# Patient Record
Sex: FEMALE | Race: WHITE | NOT HISPANIC OR LATINO | Employment: FULL TIME | URBAN - METROPOLITAN AREA
[De-identification: names, ages, dates, MRNs, and addresses within clinical notes are randomized per-mention and may not be internally consistent; named-entity substitution may affect disease eponyms.]

---
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
## 2022-10-11 ENCOUNTER — HOSPITAL ENCOUNTER (EMERGENCY)
Facility: HOSPITAL | Age: 22
Discharge: HOME | End: 2022-10-11
Payer: MEDICAID

## 2022-10-11 VITALS
HEART RATE: 84 BPM | TEMPERATURE: 100 F | OXYGEN SATURATION: 98 % | DIASTOLIC BLOOD PRESSURE: 97 MMHG | WEIGHT: 150 LBS | HEIGHT: 66 IN | SYSTOLIC BLOOD PRESSURE: 129 MMHG | BODY MASS INDEX: 24.11 KG/M2 | RESPIRATION RATE: 18 BRPM

## 2022-10-11 DIAGNOSIS — R05.1 ACUTE COUGH: Primary | ICD-10-CM

## 2022-10-11 DIAGNOSIS — J06.9 UPPER RESPIRATORY TRACT INFECTION, UNSPECIFIED TYPE: ICD-10-CM

## 2022-10-11 LAB
GROUP A STREP, MOLECULAR: NEGATIVE
SARS-COV-2 RDRP RESP QL NAA+PROBE: NEGATIVE

## 2022-10-11 PROCEDURE — 87651 STREP A DNA AMP PROBE: CPT | Performed by: NURSE PRACTITIONER

## 2022-10-11 PROCEDURE — U0002 COVID-19 LAB TEST NON-CDC: HCPCS | Performed by: NURSE PRACTITIONER

## 2022-10-11 PROCEDURE — 99284 EMERGENCY DEPT VISIT MOD MDM: CPT

## 2022-10-11 RX ORDER — ALBUTEROL SULFATE 90 UG/1
2 AEROSOL, METERED RESPIRATORY (INHALATION) EVERY 6 HOURS PRN
Qty: 90 G | Refills: 0 | Status: SHIPPED | OUTPATIENT
Start: 2022-10-11 | End: 2023-10-11

## 2022-10-11 RX ORDER — AZITHROMYCIN 250 MG/1
TABLET, FILM COATED ORAL
Qty: 6 TABLET | Refills: 0 | Status: SHIPPED | OUTPATIENT
Start: 2022-10-11 | End: 2022-10-16

## 2022-10-11 NOTE — Clinical Note
"Stephanie MATTHEWS"Kirk Nathan was seen and treated in our emergency department on 10/11/2022.  She may return to school on 10/12/2022.      If you have any questions or concerns, please don't hesitate to call.      Adarsh Whitmore NP"

## 2022-10-12 NOTE — ED PROVIDER NOTES
Encounter Date: 10/11/2022       History     Chief Complaint   Patient presents with    Cough     Pt c/o cough since Saturday.      Patient is a 21 year female presents emergency room with mother with persistent cough and complaint of chest soreness now secondary to coughing.  Patient denies having any fever, chills, nausea, vomiting diarrhea.  Patient denies any obvious sinus drainage or sore throat.  Patient states she started have some chest discomfort when she is coughing.  Patient states cough is worse at night when she lays down.    Review of patient's allergies indicates:  No Known Allergies  Past Medical History:   Diagnosis Date    Migraine headache      Past Surgical History:   Procedure Laterality Date    TYMPANOSTOMY TUBE PLACEMENT       No family history on file.  Social History     Tobacco Use    Smoking status: Never    Smokeless tobacco: Never   Substance Use Topics    Alcohol use: No    Drug use: No     Review of Systems   Constitutional: Negative.    HENT:  Negative for postnasal drip, rhinorrhea, sore throat and trouble swallowing.    Eyes: Negative.    Respiratory:  Positive for cough and chest tightness (With coughing). Negative for shortness of breath and wheezing.    Cardiovascular: Negative.    Gastrointestinal: Negative.    Endocrine: Negative.    Genitourinary: Negative.    Musculoskeletal: Negative.    Allergic/Immunologic: Negative for food allergies.   Neurological: Negative.    Hematological: Negative.    Psychiatric/Behavioral: Negative.     All other systems reviewed and are negative.    Physical Exam     Initial Vitals [10/11/22 2030]   BP Pulse Resp Temp SpO2   (!) 129/97 84 18 99.5 °F (37.5 °C) 98 %      MAP       --         Physical Exam    Nursing note and vitals reviewed.  Constitutional: She appears well-developed and well-nourished. She is not diaphoretic. No distress.   HENT:   Head: Normocephalic and atraumatic.   Mouth/Throat: Mucous membranes are normal. Posterior  oropharyngeal erythema present. No oropharyngeal exudate.   Eyes: EOM are normal. Pupils are equal, round, and reactive to light. No scleral icterus.   Neck:   Normal range of motion.  Cardiovascular:  Normal rate, regular rhythm, normal heart sounds and intact distal pulses.           No murmur heard.  Pulmonary/Chest: Breath sounds normal. No respiratory distress. She has no wheezes. She has no rhonchi. She has no rales. She exhibits no tenderness.   Musculoskeletal:         General: No edema. Normal range of motion.      Cervical back: Normal range of motion.     Lymphadenopathy:     She has no cervical adenopathy.   Neurological: She is alert. She has normal strength. GCS score is 15. GCS eye subscore is 4. GCS verbal subscore is 5. GCS motor subscore is 6.   Skin: Skin is warm and dry. Capillary refill takes 2 to 3 seconds.   Psychiatric: She has a normal mood and affect.       ED Course   Procedures  Labs Reviewed   GROUP A STREP, MOLECULAR   SARS-COV-2 RNA AMPLIFICATION, QUAL    Narrative:     Is the patient symptomatic?->Yes          Imaging Results    None          Medications - No data to display  Medical Decision Making:   Initial Assessment:   Patient seen examined emergency room.  Assessment as noted above.  Patient appears acute distress this time.  No significant coughing during exam.  Differential Diagnosis:   Bronchitis, pneumonia, asthma, COVID, strep, flu  Clinical Tests:   Lab Tests: Ordered and Reviewed       <> Summary of Lab: Strep and COVID are negative.  ED Management:  After patient was examined, labs reviewed.  Will treat patient for sinusitis, allergic rhinitis, cough.  Will prescribe patient azithromycin for sinusitis, albuterol as needed to aid with possible bronchospasms from a cough.  Advised patient mother she can take over-the-counter Zyrtec daily for allergic rhinitis as well as over-the-counter cough medicine as needed for cough.                        Clinical Impression:   Final  diagnoses:  [R05.1] Acute cough (Primary)  [J06.9] Upper respiratory tract infection, unspecified type      ED Disposition Condition    Discharge Stable          ED Prescriptions       Medication Sig Dispense Start Date End Date Auth. Provider    azithromycin (Z-SHYAM) 250 MG tablet Take 2 tablets by mouth on day 1; Take 1 tablet by mouth on days 2-5 6 tablet 10/11/2022 10/16/2022 Adarsh Whitmore NP    albuterol (PROVENTIL/VENTOLIN HFA) 90 mcg/actuation inhaler Inhale 2 puffs into the lungs every 6 (six) hours as needed for Wheezing or Shortness of Breath (Or cough). Rescue 90 g 10/11/2022 10/11/2023 Adarsh Whitmore NP          Follow-up Information       Follow up With Specialties Details Why Contact Info    [facility] In 1 week If symptoms worsen, As needed [address] [facility] [address] [phone]               Adarsh Whitmore NP  10/11/22 8072

## 2022-10-12 NOTE — DISCHARGE INSTRUCTIONS
Take medication as prescribed.    I would advised to take Zyrtec every day for seasonal allergies.    Use albuterol as needed for shortness of breath, wheezing or uncontrolled cough.    You may also use over-the-counter cough medication as needed for cough if inhaler does not help.  You may follow-up with primary care provider return emergency room if you have any worsening symptoms, or do not show any improvement in next 3-5 days.

## 2022-12-04 ENCOUNTER — OFFICE VISIT (OUTPATIENT)
Dept: URGENT CARE | Facility: CLINIC | Age: 22
End: 2022-12-04
Payer: MEDICAID

## 2022-12-04 VITALS
SYSTOLIC BLOOD PRESSURE: 110 MMHG | DIASTOLIC BLOOD PRESSURE: 72 MMHG | TEMPERATURE: 100 F | HEIGHT: 66 IN | BODY MASS INDEX: 24.11 KG/M2 | HEART RATE: 84 BPM | WEIGHT: 150 LBS | OXYGEN SATURATION: 98 %

## 2022-12-04 DIAGNOSIS — J11.1 INFLUENZA: Primary | ICD-10-CM

## 2022-12-04 DIAGNOSIS — R50.9 FEVER, UNSPECIFIED FEVER CAUSE: ICD-10-CM

## 2022-12-04 LAB
CTP QC/QA: YES
MOLECULAR STREP A: NEGATIVE
POC MOLECULAR INFLUENZA A AGN: POSITIVE
POC MOLECULAR INFLUENZA B AGN: NEGATIVE
SARS-COV-2 AG RESP QL IA.RAPID: NEGATIVE

## 2022-12-04 PROCEDURE — 1160F RVW MEDS BY RX/DR IN RCRD: CPT | Mod: CPTII,S$GLB,, | Performed by: NURSE PRACTITIONER

## 2022-12-04 PROCEDURE — 99203 PR OFFICE/OUTPT VISIT, NEW, LEVL III, 30-44 MIN: ICD-10-PCS | Mod: S$GLB,,, | Performed by: NURSE PRACTITIONER

## 2022-12-04 PROCEDURE — 3078F PR MOST RECENT DIASTOLIC BLOOD PRESSURE < 80 MM HG: ICD-10-PCS | Mod: CPTII,S$GLB,, | Performed by: NURSE PRACTITIONER

## 2022-12-04 PROCEDURE — 3078F DIAST BP <80 MM HG: CPT | Mod: CPTII,S$GLB,, | Performed by: NURSE PRACTITIONER

## 2022-12-04 PROCEDURE — 87811 SARS CORONAVIRUS 2 ANTIGEN POCT, MANUAL READ: ICD-10-PCS | Mod: QW,S$GLB,, | Performed by: NURSE PRACTITIONER

## 2022-12-04 PROCEDURE — 3074F SYST BP LT 130 MM HG: CPT | Mod: CPTII,S$GLB,, | Performed by: NURSE PRACTITIONER

## 2022-12-04 PROCEDURE — 87502 INFLUENZA DNA AMP PROBE: CPT | Mod: QW,S$GLB,, | Performed by: NURSE PRACTITIONER

## 2022-12-04 PROCEDURE — 87651 POCT STREP A MOLECULAR: ICD-10-PCS | Mod: QW,S$GLB,, | Performed by: NURSE PRACTITIONER

## 2022-12-04 PROCEDURE — 1160F PR REVIEW ALL MEDS BY PRESCRIBER/CLIN PHARMACIST DOCUMENTED: ICD-10-PCS | Mod: CPTII,S$GLB,, | Performed by: NURSE PRACTITIONER

## 2022-12-04 PROCEDURE — 1159F MED LIST DOCD IN RCRD: CPT | Mod: CPTII,S$GLB,, | Performed by: NURSE PRACTITIONER

## 2022-12-04 PROCEDURE — 1159F PR MEDICATION LIST DOCUMENTED IN MEDICAL RECORD: ICD-10-PCS | Mod: CPTII,S$GLB,, | Performed by: NURSE PRACTITIONER

## 2022-12-04 PROCEDURE — 87651 STREP A DNA AMP PROBE: CPT | Mod: QW,S$GLB,, | Performed by: NURSE PRACTITIONER

## 2022-12-04 PROCEDURE — 3008F PR BODY MASS INDEX (BMI) DOCUMENTED: ICD-10-PCS | Mod: CPTII,S$GLB,, | Performed by: NURSE PRACTITIONER

## 2022-12-04 PROCEDURE — 99203 OFFICE O/P NEW LOW 30 MIN: CPT | Mod: S$GLB,,, | Performed by: NURSE PRACTITIONER

## 2022-12-04 PROCEDURE — 3008F BODY MASS INDEX DOCD: CPT | Mod: CPTII,S$GLB,, | Performed by: NURSE PRACTITIONER

## 2022-12-04 PROCEDURE — 87811 SARS-COV-2 COVID19 W/OPTIC: CPT | Mod: QW,S$GLB,, | Performed by: NURSE PRACTITIONER

## 2022-12-04 PROCEDURE — 3074F PR MOST RECENT SYSTOLIC BLOOD PRESSURE < 130 MM HG: ICD-10-PCS | Mod: CPTII,S$GLB,, | Performed by: NURSE PRACTITIONER

## 2022-12-04 PROCEDURE — 87502 POCT INFLUENZA A/B MOLECULAR: ICD-10-PCS | Mod: QW,S$GLB,, | Performed by: NURSE PRACTITIONER

## 2022-12-04 RX ORDER — OSELTAMIVIR PHOSPHATE 75 MG/1
75 CAPSULE ORAL EVERY 12 HOURS
Qty: 10 CAPSULE | Refills: 0 | Status: SHIPPED | OUTPATIENT
Start: 2022-12-04 | End: 2022-12-09

## 2022-12-04 NOTE — LETTER
December 4, 2022      [place] [facility]  [facility]  [address], [address]  [facility] [address]  Phone: [phone]  Fax: [phone]       Patient: Stephanie Nathan   YOB: 2000  Date of Visit: 12/04/2022    To Whom It May Concern:    PLEASE EXCUSE FROM WORK: 12/04/22 - 12/07/22. If you have any questions or concerns, or if I can be of further assistance, please do not hesitate to contact me.    Sincerely,    Charles Fink NP

## 2022-12-04 NOTE — PROGRESS NOTES
"Subjective:       Patient ID: Stephanie Nathan is a 22 y.o. female.    Vitals:  height is 5' 6" (1.676 m) and weight is 68 kg (150 lb). Her oral temperature is 100.3 °F (37.9 °C). Her blood pressure is 110/72 and her pulse is 84. Her oxygen saturation is 98%.     Chief Complaint: Fever and Cough    This is a 22 y.o. female who presents today with a chief complaint of fever and cough x 24hr.    Fever   This is a new problem. The current episode started yesterday. The problem has been gradually worsening. The maximum temperature noted was 100 to 100.9 F. The temperature was taken using an oral thermometer. Associated symptoms include abdominal pain, chest pain, coughing, diarrhea, headaches, muscle aches, nausea and a sore throat. She has tried nothing for the symptoms.   Cough  Associated symptoms include chest pain, a fever, headaches and a sore throat.     Constitution: Positive for fever.   HENT:  Positive for sore throat.    Cardiovascular:  Positive for chest pain.   Respiratory:  Positive for cough.    Gastrointestinal:  Positive for abdominal pain, nausea and diarrhea.   Neurological:  Positive for headaches.     Objective:      Physical Exam   Constitutional: She is cooperative. normalawake  HENT:   Head: Normocephalic and atraumatic.   Ears:   Right Ear: Hearing, tympanic membrane, external ear and ear canal normal.   Left Ear: Hearing, tympanic membrane, external ear and ear canal normal.   Nose: Nose normal.   Mouth/Throat: Uvula is midline, oropharynx is clear and moist and mucous membranes are normal. Mucous membranes are moist. Oropharynx is clear.   Eyes: Conjunctivae and lids are normal. Pupils are equal, round, and reactive to light. Extraocular movement intact   Neck: Trachea normal and phonation normal. Neck supple.   Cardiovascular: Normal rate, regular rhythm, normal heart sounds and normal pulses.   Pulmonary/Chest: Effort normal and breath sounds normal.   Abdominal: Normal appearance. "   Musculoskeletal: Normal range of motion.         General: Normal range of motion.   Lymphadenopathy:     She has no cervical adenopathy.   Neurological: She is alert.   Skin: Skin is warm, dry and intact.   Psychiatric: Her behavior is normal. Mood normal.   Vitals reviewed.      Results for orders placed or performed in visit on 12/04/22   POCT Influenza A/B MOLECULAR   Result Value Ref Range    POC Molecular Influenza A Ag Positive (A) Negative, Not Reported    POC Molecular Influenza B Ag Negative Negative, Not Reported     Acceptable Yes    POCT Strep A, Molecular   Result Value Ref Range    Molecular Strep A, POC Negative Negative     Acceptable Yes    SARS Coronavirus 2 Antigen, POCT Manual Read   Result Value Ref Range    SARS Coronavirus 2 Antigen Negative Negative     Acceptable Yes     No results found.     Assessment:       1. Influenza    2. Fever, unspecified fever cause          Plan:         Influenza    Fever, unspecified fever cause  -     POCT Influenza A/B MOLECULAR  -     POCT Strep A, Molecular  -     SARS Coronavirus 2 Antigen, POCT Manual Read

## 2024-12-10 ENCOUNTER — OFFICE VISIT (OUTPATIENT)
Dept: PODIATRY | Facility: CLINIC | Age: 24
End: 2024-12-10

## 2024-12-10 VITALS
SYSTOLIC BLOOD PRESSURE: 126 MMHG | HEART RATE: 76 BPM | HEIGHT: 66 IN | WEIGHT: 162.81 LBS | DIASTOLIC BLOOD PRESSURE: 77 MMHG | BODY MASS INDEX: 26.17 KG/M2

## 2024-12-10 DIAGNOSIS — B35.1 ONYCHOMYCOSIS DUE TO DERMATOPHYTE: Primary | ICD-10-CM

## 2024-12-10 PROCEDURE — 99999 PR PBB SHADOW E&M-EST. PATIENT-LVL III: CPT | Mod: PBBFAC,,, | Performed by: PODIATRIST

## 2024-12-10 PROCEDURE — 99203 OFFICE O/P NEW LOW 30 MIN: CPT | Mod: ,,, | Performed by: PODIATRIST

## 2024-12-14 PROBLEM — B35.1 ONYCHOMYCOSIS DUE TO DERMATOPHYTE: Status: ACTIVE | Noted: 2024-12-14

## 2024-12-14 NOTE — PROGRESS NOTES
Subjective:       Patient ID: Stephanie Nathan is a 24 y.o. female.    Chief Complaint: Fungus (Right great toe)    Patient presents today she is complaining about fungal toenails primarily on her right great toe she states about a year ago she lost both of her big toenails the left when grew an okay the right 1 grew in very thick with what she thinks to be fungus.  Past Medical History:   Diagnosis Date    Migraine headache      Past Surgical History:   Procedure Laterality Date    TYMPANOSTOMY TUBE PLACEMENT       No family history on file.  Social History     Socioeconomic History    Marital status: Single   Tobacco Use    Smoking status: Never    Smokeless tobacco: Never   Substance and Sexual Activity    Alcohol use: No    Drug use: No    Sexual activity: Yes     Partners: Male     Birth control/protection: OCP   Social History Narrative    Plans from [facility]        Adolescent exam    Acne    Dysmenorrhea    Pelvic pain    Menorrhagia    Irregular menstrual cycles        visit Summary:    CBC, TSH    GC/CT    Denies sexual activity or tampon use- pelvic exam deferred        Ordered:    PELVIS COMPLETE- within normal limits, resolving right ovarian cyst        Prescriptions:    SIG: Apri 0.15-0.03 mg oral tablet, 28 days, Dispense #28 Tablet, 11 Refills    Directions: Take 1 oral tablet once a day        Plan:    Return for follow up appointment in 3 months       Current Outpatient Medications   Medication Sig Dispense Refill    desogestreL-ethinyl estradioL (ISIBLOOM) 0.15-0.03 mg per tablet Take 1 tablet by mouth once daily. 84 tablet 3     No current facility-administered medications for this visit.     Review of patient's allergies indicates:  No Known Allergies    Review of Systems   Skin:  Positive for color change.   All other systems reviewed and are negative.      Objective:      Vitals:    12/10/24 1526   BP: 126/77   BP Location: Left arm   Patient Position: Sitting   Pulse: 76   Weight: 73.8 kg (162  "lb 12.8 oz)   Height: 5' 6" (1.676 m)     Physical Exam  Vitals and nursing note reviewed.   Constitutional:       Appearance: Normal appearance.   Cardiovascular:      Pulses:           Dorsalis pedis pulses are 2+ on the right side and 2+ on the left side.        Posterior tibial pulses are 2+ on the right side and 2+ on the left side.   Pulmonary:      Effort: Pulmonary effort is normal.   Musculoskeletal:         General: Deformity present.        Feet:    Feet:      Right foot:      Protective Sensation: 2 sites tested.  2 sites sensed.      Toenail Condition: Fungal disease present.     Left foot:      Protective Sensation: 2 sites tested.  2 sites sensed.   Skin:     General: Skin is warm.      Capillary Refill: Capillary refill takes less than 2 seconds.   Neurological:      General: No focal deficit present.      Mental Status: She is alert.   Psychiatric:         Mood and Affect: Mood normal.         Behavior: Behavior normal.              Assessment:       1. Onychomycosis due to dermatophyte        Plan:       Patient presents today she is complaining about fungal toenails primarily on her right great toe she states about a year ago she lost both of her big toenails the left when grew an okay the right 1 grew in very thick with what she thinks to be fungus.  A comprehensive new patient evaluation performed patient advised she does have fungal infection in the right hallux nail the nail is thick discolored somewhat  from the distal aspect of the nail bed with streaking in the nail consistent with fungal involvement.  I did advised the patient frequently when it toenail is traumatized as the nail grows back it allows fungus to enter the nail she clearly has fungal involvement in the right hallux nail I advised her she needs to monitor her other toenails she has toenail Polish on which I advised her can actually make the fungus worse.  I did discuss treatment options with the patient I advised the " patient I would recommend application of Vicks vapor rub this needs to be applied twice a day every day and can take 4-6 months to see positive signs of results depending upon how fast her nail grows I have also recommended filing it 2 times per week to help with the penetration of the Vicks vapor rub I did advised the patient this is usually very successful I would recommend trying this 1st if she is not getting results in 6 months there are other treatment options but certainly she wants to treat this now as this will get progressively worse over time.  Patient was in understanding and agreement with everything discussed follow-up will be as needed.This note was created using [employer] voice recognition software that occasionally misinterpreted phrases or words.